# Patient Record
Sex: MALE | Race: WHITE | NOT HISPANIC OR LATINO | Employment: FULL TIME | URBAN - NONMETROPOLITAN AREA
[De-identification: names, ages, dates, MRNs, and addresses within clinical notes are randomized per-mention and may not be internally consistent; named-entity substitution may affect disease eponyms.]

---

## 2023-03-17 PROBLEM — J30.9 ALLERGIC RHINITIS: Status: ACTIVE | Noted: 2023-03-17

## 2023-03-17 PROBLEM — E66.9 OBESITY: Status: ACTIVE | Noted: 2023-03-17

## 2023-03-17 PROBLEM — E55.9 VITAMIN D DEFICIENCY: Status: ACTIVE | Noted: 2023-03-17

## 2023-03-17 PROBLEM — R53.82 CHRONIC FATIGUE: Status: ACTIVE | Noted: 2023-03-17

## 2023-03-17 PROBLEM — E78.5 DYSLIPIDEMIA: Status: ACTIVE | Noted: 2023-03-17

## 2023-03-17 PROBLEM — M25.561 RIGHT KNEE PAIN: Status: ACTIVE | Noted: 2023-03-17

## 2023-03-17 RX ORDER — DICLOFENAC SODIUM 75 MG/1
75 TABLET, DELAYED RELEASE ORAL 2 TIMES DAILY
COMMUNITY
Start: 2021-03-01

## 2023-03-17 RX ORDER — BACLOFEN 20 MG/1
TABLET ORAL NIGHTLY
COMMUNITY
Start: 2021-03-01

## 2023-03-17 RX ORDER — FLUTICASONE PROPIONATE 50 MCG
SPRAY, SUSPENSION (ML) NASAL 2 TIMES DAILY
COMMUNITY
Start: 2021-12-10

## 2023-03-31 ENCOUNTER — OFFICE VISIT (OUTPATIENT)
Dept: PRIMARY CARE | Facility: CLINIC | Age: 33
End: 2023-03-31
Payer: COMMERCIAL

## 2023-03-31 VITALS
WEIGHT: 250.4 LBS | HEIGHT: 74 IN | SYSTOLIC BLOOD PRESSURE: 138 MMHG | OXYGEN SATURATION: 98 % | BODY MASS INDEX: 32.14 KG/M2 | HEART RATE: 76 BPM | DIASTOLIC BLOOD PRESSURE: 82 MMHG

## 2023-03-31 DIAGNOSIS — E55.9 VITAMIN D DEFICIENCY: ICD-10-CM

## 2023-03-31 DIAGNOSIS — Z13.9 SCREENING FOR CONDITION: ICD-10-CM

## 2023-03-31 DIAGNOSIS — M75.92 SHOULDER LESION, LEFT: Primary | ICD-10-CM

## 2023-03-31 DIAGNOSIS — E78.5 DYSLIPIDEMIA: ICD-10-CM

## 2023-03-31 DIAGNOSIS — R53.82 CHRONIC FATIGUE: ICD-10-CM

## 2023-03-31 PROBLEM — D17.9 MULTIPLE LIPOMAS: Status: ACTIVE | Noted: 2018-09-17

## 2023-03-31 PROCEDURE — 99213 OFFICE O/P EST LOW 20 MIN: CPT | Performed by: FAMILY MEDICINE

## 2023-03-31 PROCEDURE — 1036F TOBACCO NON-USER: CPT | Performed by: FAMILY MEDICINE

## 2023-03-31 PROCEDURE — 3008F BODY MASS INDEX DOCD: CPT | Performed by: FAMILY MEDICINE

## 2023-03-31 ASSESSMENT — PAIN SCALES - GENERAL: PAINLEVEL: 0-NO PAIN

## 2023-03-31 NOTE — PROGRESS NOTES
"Subjective     Perez Chavez is a 32 y.o. male who presents for Follow-up (Follow up meds).      HPI  The patient is a 32 year-old male presenting to the clinic for follow up on medications.   Discussed skin lesion on left shoulder.  MRI left shoulder on or after 10/2/2023.  IMPRESSION:  Nonaggressive marrow replacing lesion in the proximal humeral  diaphysis corresponding to sclerotic lesion seen on recent  radiographs. 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 Radiographic follow-up in 6 months can be performed to  confirm stability.  Recommended follow-up MRI and order was placed  Discussed diet and exercise.    Educated obesity and diet exercise.  Advised lose weight.  Complaining feeling tired.  Advised on diet exercise.  Educated on dyslipidemia and diet exercise.  Educated on obesity and diet and exercise.  Advised to lose weight.  Review of Systems  Review of systems    General.  Denies fever.  Denies chills.    HEENT denies nasal congestion.  Denies sinus pressure.    Respiratory.  Denies cough.  Denies shortness of breath.    Cardiovascular.  Denies chest pain.  Denies heart palpitations.  Denies shortness of breath.    Gastrointestinal.  Denies nausea vomiting diarrhea.  Denies abdominal pain.    Genitourinary denies burning urination.  Denies frequent urination.  Denies flank pain.  Denies blood in the urine.  Denies abnormal vaginal discharge.    Neurology.  Denies tingling numbness but denies weakness.  Denies headache.  Denies blurred vision.    Musculoskeletal.  Denies body aches.  Denies joint pains.  Denies muscle aches.  Denies muscle weakness    Endocrinology.  Dictated as above.      Psychiatric.  Denies depression.  Denies anxiety.  Denies suicidal.  Denies homicidal.      Objective   /82 (BP Location: Right arm, Patient Position: Sitting, BP Cuff Size: Large adult)   Pulse 76   Ht 1.88 m (6' 2\")   Wt 114 kg (250 lb 6.4 oz)   SpO2 98%   BMI 32.15 kg/m²    "     Physical Exam  General.  Not in distress.  HEENT normocephalic anicteric sclerae.  Neck soft supple no thyromegaly.    Lungs are clear.  Heart regular.  Abdomen soft nontender nondistended bowel sounds are positive.  Extremities no clubbing cyanosis or edema.  Psychiatric.  Has good eye contact.  No crying spells noted.  Speech was normal.  Denies depression.  Denies suicidal.  Denies homicidal.    Assessment/Plan   1.  Shoulder lesion, left.  We will review reviewed MRI results.  We will repeat MRI in 6 to 7 months.  He was provided order.      2.  Obesity.  Dictated as above.    3.  Fatigue.  Dictated as above.    4.  Dyslipidemia.  Dictated as above    5.  Vitamin D deficiency.  Educated on vitamin D deficiency we will continue monitor.              Problem List Items Addressed This Visit          Endocrine/Metabolic    Vitamin D deficiency    Relevant Orders    Vitamin D 25-Hydroxy,Total       Other    Dyslipidemia    Relevant Orders    Lipid panel    Chronic fatigue    Relevant Orders    CBC and Auto Differential    Comprehensive metabolic panel    Urinalysis with Reflex Microscopic    Tsh With Reflex To Free T4 If Abnormal    Vitamin B12    Folate     Other Visit Diagnoses       Shoulder lesion, left    -  Primary    Relevant Orders    MR humerus left w and wo IV contrast    BMI 32.0-32.9,adult        Screening for condition        Relevant Orders    HIV 1/2 antibodies, rapid    Hepatitis C antibody        Scribe Attestation  By signing my name below, IMaeve Scribe   attest that this documentation has been prepared under the direction and in the presence of Jer Headley MD.

## 2023-05-17 ENCOUNTER — TELEMEDICINE (OUTPATIENT)
Dept: PRIMARY CARE | Facility: CLINIC | Age: 33
End: 2023-05-17
Payer: COMMERCIAL

## 2023-05-17 DIAGNOSIS — J30.89 NON-SEASONAL ALLERGIC RHINITIS DUE TO OTHER ALLERGIC TRIGGER: Primary | ICD-10-CM

## 2023-05-17 DIAGNOSIS — J40 BRONCHITIS: ICD-10-CM

## 2023-05-17 DIAGNOSIS — J01.01 ACUTE RECURRENT MAXILLARY SINUSITIS: ICD-10-CM

## 2023-05-17 PROCEDURE — 99213 OFFICE O/P EST LOW 20 MIN: CPT | Performed by: FAMILY MEDICINE

## 2023-05-17 RX ORDER — BENZONATATE 200 MG/1
200 CAPSULE ORAL 3 TIMES DAILY PRN
Qty: 20 CAPSULE | Refills: 0 | Status: SHIPPED | OUTPATIENT
Start: 2023-05-17 | End: 2023-05-24

## 2023-05-17 RX ORDER — AMOXICILLIN AND CLAVULANATE POTASSIUM 875; 125 MG/1; MG/1
875 TABLET, FILM COATED ORAL 2 TIMES DAILY
Qty: 20 TABLET | Refills: 0 | Status: SHIPPED | OUTPATIENT
Start: 2023-05-17 | End: 2023-05-27

## 2023-05-18 NOTE — PROGRESS NOTES
Subjective     Perez Chavez is a 32 y.o. male who presents for Cough and URI (Cough/congestion).    This was completed via telephone due to the restrictions of the COVID-19 pandemic.  All issues as below were discussed and addressed but no physical exam was performed.  If it was felt that the patient should be evaluated in clinic then they were director there.  The patient/parent verbally consented to the visit.    Telemedicine/video call appointment    HPI    Home COVID test was negative.  Complaining cough and chest congestion.  Complaining nasal congestion sinus pressure facial pain and headache.  Taking Claritin.  Denies shortness of breath.      Review of Systems    Detailed as above    Objective      Virtual visit      Physical Exam  Virtual visit  Assessment/Plan     1.  Allergic rhinitis.  Educated on allergic rhinitis.  Explained adverse postmedication continue Claritin.    2.  Maxillary sinusitis.  Educated on sinusitis.  Advised to increase oral fluid intake.  Explained adverse effects of medication.       3.  Bronchitis.  Started on medication.  Explained adverse effects of medication.  Advised to call 911 or to go to the emergency room versus possible if develops high fever or shortness of breath.                Problem List Items Addressed This Visit          Other    Allergic rhinitis - Primary     Other Visit Diagnoses       Acute recurrent maxillary sinusitis        Relevant Medications    amoxicillin-pot clavulanate (Augmentin) 875-125 mg tablet    Bronchitis        Relevant Medications    amoxicillin-pot clavulanate (Augmentin) 875-125 mg tablet    benzonatate (Tessalon) 200 mg capsule

## 2023-12-20 ENCOUNTER — HOSPITAL ENCOUNTER (OUTPATIENT)
Dept: RADIOLOGY | Facility: HOSPITAL | Age: 33
Discharge: HOME | End: 2023-12-20
Payer: COMMERCIAL

## 2023-12-20 DIAGNOSIS — M75.122 COMPLETE ROTATOR CUFF TEAR OR RUPTURE OF LEFT SHOULDER, NOT SPECIFIED AS TRAUMATIC: ICD-10-CM

## 2023-12-20 PROCEDURE — 73221 MRI JOINT UPR EXTREM W/O DYE: CPT | Mod: LT
